# Patient Record
Sex: MALE | Race: WHITE
[De-identification: names, ages, dates, MRNs, and addresses within clinical notes are randomized per-mention and may not be internally consistent; named-entity substitution may affect disease eponyms.]

---

## 2017-12-11 ENCOUNTER — HOSPITAL ENCOUNTER (EMERGENCY)
Dept: HOSPITAL 4 - SED | Age: 47
Discharge: HOME | End: 2017-12-11
Payer: COMMERCIAL

## 2017-12-11 VITALS — SYSTOLIC BLOOD PRESSURE: 130 MMHG

## 2017-12-11 VITALS — BODY MASS INDEX: 30.45 KG/M2 | HEIGHT: 67 IN | WEIGHT: 194 LBS

## 2017-12-11 VITALS — SYSTOLIC BLOOD PRESSURE: 128 MMHG
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
## 2017-12-11 DIAGNOSIS — R03.0: ICD-10-CM

## 2017-12-11 DIAGNOSIS — S39.012A: Primary | ICD-10-CM
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
## 2017-12-11 DIAGNOSIS — Z86.59: ICD-10-CM

## 2017-12-11 PROCEDURE — 99283 EMERGENCY DEPT VISIT LOW MDM: CPT

## 2017-12-11 PROCEDURE — 96372 THER/PROPH/DIAG INJ SC/IM: CPT

## 2017-12-11 NOTE — NUR
Pt brought by self, A&Ox4, pt c/o lower back pain 8/10 ,pain started after 
caring a TV, VS WNL,skin pink and warm, cap refill <3, denies urinary symptoms

## 2017-12-11 NOTE — NUR
Patient given written and verbal discharge instructions and verbalizes 
understanding.  ER MD discussed with patient the results and treatment 
provided. Patient in stable condition. ID arm band removed. 

Rx of Naproxen and Soma  given. Patient educated on pain management and to 
follow up with PMD. Pain Scale 3/10 tolerable for pt  .

Opportunity for questions provided and answered.

## 2022-07-20 ENCOUNTER — HOSPITAL ENCOUNTER (EMERGENCY)
Dept: HOSPITAL 4 - SED | Age: 52
Discharge: HOME | End: 2022-07-20
Payer: COMMERCIAL

## 2022-07-20 VITALS — HEIGHT: 67 IN | BODY MASS INDEX: 32.8 KG/M2 | WEIGHT: 209 LBS

## 2022-07-20 VITALS — SYSTOLIC BLOOD PRESSURE: 144 MMHG
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
## 2022-07-20 DIAGNOSIS — S83.91XA: Primary | ICD-10-CM
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
